# Patient Record
Sex: MALE
[De-identification: names, ages, dates, MRNs, and addresses within clinical notes are randomized per-mention and may not be internally consistent; named-entity substitution may affect disease eponyms.]

---

## 2018-12-14 PROBLEM — Z00.00 ENCOUNTER FOR PREVENTIVE HEALTH EXAMINATION: Status: ACTIVE | Noted: 2018-12-14

## 2019-04-17 ENCOUNTER — RECORD ABSTRACTING (OUTPATIENT)
Age: 29
End: 2019-04-17

## 2019-04-30 ENCOUNTER — APPOINTMENT (OUTPATIENT)
Dept: HEART AND VASCULAR | Facility: CLINIC | Age: 29
End: 2019-04-30
Payer: COMMERCIAL

## 2019-04-30 ENCOUNTER — NON-APPOINTMENT (OUTPATIENT)
Age: 29
End: 2019-04-30

## 2019-04-30 VITALS
BODY MASS INDEX: 22.6 KG/M2 | WEIGHT: 144 LBS | HEART RATE: 77 BPM | SYSTOLIC BLOOD PRESSURE: 110 MMHG | HEIGHT: 67 IN | DIASTOLIC BLOOD PRESSURE: 72 MMHG

## 2019-04-30 DIAGNOSIS — G43.709 CHRONIC MIGRAINE W/OUT AURA, NOT INTRACTABLE, W/OUT STATUS MIGRAINOSUS: ICD-10-CM

## 2019-04-30 DIAGNOSIS — K20.0 EOSINOPHILIC ESOPHAGITIS: ICD-10-CM

## 2019-04-30 DIAGNOSIS — Z82.49 FAMILY HISTORY OF ISCHEMIC HEART DISEASE AND OTHER DISEASES OF THE CIRCULATORY SYSTEM: ICD-10-CM

## 2019-04-30 DIAGNOSIS — Z84.89 FAMILY HISTORY OF OTHER SPECIFIED CONDITIONS: ICD-10-CM

## 2019-04-30 DIAGNOSIS — Z87.19 PERSONAL HISTORY OF OTHER DISEASES OF THE DIGESTIVE SYSTEM: ICD-10-CM

## 2019-04-30 PROCEDURE — 93000 ELECTROCARDIOGRAM COMPLETE: CPT

## 2019-04-30 PROCEDURE — 99215 OFFICE O/P EST HI 40 MIN: CPT

## 2019-04-30 RX ORDER — FLUTICASONE PROPIONATE AND SALMETEROL 500; 50 UG/1; UG/1
POWDER RESPIRATORY (INHALATION)
Refills: 0 | Status: ACTIVE | COMMUNITY

## 2019-04-30 RX ORDER — PROCHLORPERAZINE MALEATE 10 MG/1
10 TABLET, FILM COATED ORAL
Refills: 0 | Status: ACTIVE | COMMUNITY

## 2019-04-30 RX ORDER — FREMANEZUMAB-VFRM 225 MG/1.5ML
225 INJECTION SUBCUTANEOUS
Refills: 0 | Status: ACTIVE | COMMUNITY

## 2019-04-30 NOTE — PHYSICAL EXAM
[General Appearance - Well Developed] : well developed [Normal Appearance] : normal appearance [Well Groomed] : well groomed [General Appearance - Well Nourished] : well nourished [No Deformities] : no deformities [General Appearance - In No Acute Distress] : no acute distress [Normal Conjunctiva] : the conjunctiva exhibited no abnormalities [Eyelids - No Xanthelasma] : the eyelids demonstrated no xanthelasmas [Normal Oral Mucosa] : normal oral mucosa [No Oral Pallor] : no oral pallor [No Oral Cyanosis] : no oral cyanosis [Normal Jugular Venous A Waves Present] : normal jugular venous A waves present [Normal Jugular Venous V Waves Present] : normal jugular venous V waves present [No Jugular Venous Rea A Waves] : no jugular venous rea A waves [Heart Rate And Rhythm] : heart rate and rhythm were normal [Heart Sounds] : normal S1 and S2 [Murmurs] : no murmurs present [Respiration, Rhythm And Depth] : normal respiratory rhythm and effort [Exaggerated Use Of Accessory Muscles For Inspiration] : no accessory muscle use [Auscultation Breath Sounds / Voice Sounds] : lungs were clear to auscultation bilaterally [Abdomen Soft] : soft [Abdomen Tenderness] : non-tender [Abdomen Mass (___ Cm)] : no abdominal mass palpated [Abnormal Walk] : normal gait [Gait - Sufficient For Exercise Testing] : the gait was sufficient for exercise testing [Skin Color & Pigmentation] : normal skin color and pigmentation [] : no rash [No Venous Stasis] : no venous stasis [Skin Lesions] : no skin lesions [No Skin Ulcers] : no skin ulcer [No Xanthoma] : no  xanthoma was observed [Oriented To Time, Place, And Person] : oriented to person, place, and time [Affect] : the affect was normal [Mood] : the mood was normal [No Anxiety] : not feeling anxious

## 2019-07-11 ENCOUNTER — APPOINTMENT (OUTPATIENT)
Dept: HEART AND VASCULAR | Facility: CLINIC | Age: 29
End: 2019-07-11
Payer: COMMERCIAL

## 2019-07-11 PROCEDURE — 93306 TTE W/DOPPLER COMPLETE: CPT

## 2019-07-30 ENCOUNTER — OTHER (OUTPATIENT)
Age: 29
End: 2019-07-30

## 2019-09-12 NOTE — ASSESSMENT
[FreeTextEntry1] : 29 year old male w/ a family history of premature CAD and a PMHx of bicuspid aortic valve, migraines, chronic neck and back pain, who his known to me from [facility] presents to establish care.

## 2019-09-12 NOTE — DISCUSSION/SUMMARY
[FreeTextEntry1] : Will get an echo to assess aortic valve and proximal aortic dimensions. Will get an MRA a year from now and then reassess every other year alternating. \par Explained that ideal LDL would be <100 and he will attempt to improve with diet although he is somewhat limited. \par Advised to continue current healthy lifestyle. \par Return in 6 months for follow-up. \par \par *Addendum- Mr. Coe's echocardiogram visualizes only the very proximal ascending aorta well. The bulk of the ascending aorta could not be visualized based on the fact that only limited imaging could be obtained in the right parasternal views.  Based on this finding, the patient will require further assessment of the entire ascending aorta with imaging study.

## 2019-09-12 NOTE — HISTORY OF PRESENT ILLNESS
[FreeTextEntry1] : 29 year old male w/ a family history of premature CAD and a PMHx of bicuspid aortic valve, migraines, chronic neck and back pain, who his known to me from [facility] presents to establish care. \par \par He has been feeling very well lately and reports that the new preventive medication (Ajovy- anitbody for calcitonin) that he is taking for his migraines has been very effective. He is exercising regularly and at a new job that he enjoys. No symptoms noted on exertion. He is limited by food allergies in terms of diet. He has been exercising regularly without any symptoms.\par \par 5/15/2017 -  TG 62 HDL 61  A1c 5.2%\par \par MRI cervical spine w/o contrast neuro 17 - small central disc protrusion at C4-C5; cervical spine canal and neural foramina are patent throughout \par \par MRA thoracic aorta 17 - no aortic aneurysm or coarctation; aortic valve is bicuspid w/ mild aortic regurgitation and mild-moderate stenosis \par \par echo 18 - no LVH; LV global wall motion is normal; LVEF normal (65%); RV is normal in size and function; aortic root is normal in size; ascending aorta is dilated - diameter is 3.4cm; mild AS; aortic valve is thickened and appears trileaflet w/o any raphae; LVOT is mildly dilated as is the ascending aorta w/ mild AR w/c raises the suspicion of a bicuspid valve, however this is not apparent on this study; mild AS w/ a valve area of 1.71cm2; no pericardial effusion; no prior study available for comparison \par \par Echo  - nl LV size and function; mild AI; mild MR; nl aortic root size\par \par Maternal uncle  at 54 of heart disease, paternal GF-  had a murmur \par \par . \par

## 2019-09-12 NOTE — ASSESSMENT
[FreeTextEntry1] : 29 year old male w/ a family history of premature CAD and a PMHx of bicuspid aortic valve, migraines, chronic neck and back pain, who his known to me from [facility] presents to establish care.

## 2019-09-12 NOTE — HISTORY OF PRESENT ILLNESS
[FreeTextEntry1] : 29 year old male w/ a family history of premature CAD and a PMHx of bicuspid aortic valve, migraines, chronic neck and back pain, who his known to me from [facility] presents to establish care. \par \par He has been feeling very well lately and reports that the new preventive medication (Ajovy- anitbody for calcitonin) that he is taking for his migraines has been very effective. He is exercising regularly and at a new job that he enjoys. No symptoms noted on exertion. He is limited by food allergies in terms of diet. He has been exercising regularly without any symptoms.\par \par 5/15/2017 -  TG 62 HDL 61  A1c 5.2%\par \par MRI cervical spine w/o contrast neuro 17 - small central disc protrusion at C4-C5; cervical spine canal and neural foramina are patent throughout \par \par MRA thoracic aorta 17 - no aortic aneurysm or coarctation; aortic valve is bicuspid w/ mild aortic regurgitation and mild-moderate stenosis \par \par echo 18 - no LVH; LV global wall motion is normal; LVEF normal (65%); RV is normal in size and function; aortic root is normal in size; ascending aorta is dilated - diameter is 3.4cm; mild AS; aortic valve is thickened and appears trileaflet w/o any raphae; LVOT is mildly dilated as is the ascending aorta w/ mild AR w/c raises the suspicion of a bicuspid valve, however this is not apparent on this study; mild AS w/ a valve area of 1.71cm2; no pericardial effusion; no prior study available for comparison \par \par Echo  - nl LV size and function; mild AI; mild MR; nl aortic root size\par \par Maternal uncle  at 54 of heart disease, paternal GF-  had a murmur \par \par . \par

## 2020-04-06 ENCOUNTER — TRANSCRIPTION ENCOUNTER (OUTPATIENT)
Age: 30
End: 2020-04-06

## 2020-06-04 ENCOUNTER — APPOINTMENT (OUTPATIENT)
Dept: HEART AND VASCULAR | Facility: CLINIC | Age: 30
End: 2020-06-04
Payer: COMMERCIAL

## 2020-06-04 PROCEDURE — 99214 OFFICE O/P EST MOD 30 MIN: CPT | Mod: 95

## 2020-06-04 NOTE — HISTORY OF PRESENT ILLNESS
[Home] : at home, [unfilled] , at the time of the visit. [Medical Office: ([facility])___] : at the medical office located in  [Verbal consent obtained from patient] : the patient, [unfilled] [FreeTextEntry1] : This visit was conducted using a telehealth platform in the setting of COVID-19 Pandemic.\par Patient initiated current encounter. Patient has provided verbal authorization to participate in this visit. \par Reason for telehealth visit: follow-up of CVD\par I have spent 25 minutes with the patient discussing.\par Documentation- 5 minutes\par Physical exam was not done, however patient provided weight.\par \par \par Patient is a 31 yo M w/ a PMHx of bicuspid aortic valve, migraines, chronic neck and back pain, who his known from [facility] who calls for follow up.\par \par He has been working from home and has been healthy. Has been exercising 1-2miles almost every day, more active on weekends. Occasionally mentions some shortness of breath but only when going uphill and mentions this being more so lately that he has not been as active as before. Denies any other symptoms. Diet wise has been unchanged and he currently weights 140lbs. Has not been checking his BP.\par  \par Mentions that his mom had procedure to remove an abnormality in her aorta last year and was told the results were normal.

## 2020-06-04 NOTE — REASON FOR VISIT
[FreeTextEntry1] : Patient is a 31 yo M w/ a PMHx of bicuspid aortic valve, migraines, chronic neck and back pain, who his known from [facility] who calls for follow up.

## 2020-06-04 NOTE — DISCUSSION/SUMMARY
[___ Month(s)] : [unfilled] month(s) [With ___] : with [unfilled] [FreeTextEntry1] : Patient is a 31 yo M w/ a PMHx of bicuspid aortic valve, migraines, chronic neck and back pain, who his known from [facility] who calls for follow up.\par \par # Bicuspid aortic valve.\par - Will order echocardiogram\par - Given bicuspid aortic valve, he's at higher risk for dilatation of aorta and the ascending aorta has not been well seen on prior echoes. Will assess aorta on echo, will order for MRI to better assess based o those results.   \par \par # ASCVD risk reduction\par - Will order labs to assess lipids and A1C\par - Encouraged patient to continue healthy exercise and eating habits, focusing on a Mediterranean style of eating and aiming for the recommended 150 minutes per week of moderate physical activity.\par \par RTC in 1 month for echo and labs

## 2020-06-08 ENCOUNTER — TRANSCRIPTION ENCOUNTER (OUTPATIENT)
Age: 30
End: 2020-06-08

## 2020-06-11 ENCOUNTER — APPOINTMENT (OUTPATIENT)
Dept: HEART AND VASCULAR | Facility: CLINIC | Age: 30
End: 2020-06-11

## 2020-07-02 ENCOUNTER — TRANSCRIPTION ENCOUNTER (OUTPATIENT)
Age: 30
End: 2020-07-02

## 2020-12-28 ENCOUNTER — TRANSCRIPTION ENCOUNTER (OUTPATIENT)
Age: 30
End: 2020-12-28

## 2020-12-29 ENCOUNTER — TRANSCRIPTION ENCOUNTER (OUTPATIENT)
Age: 30
End: 2020-12-29

## 2021-02-05 ENCOUNTER — APPOINTMENT (OUTPATIENT)
Dept: HEART AND VASCULAR | Facility: CLINIC | Age: 31
End: 2021-02-05
Payer: COMMERCIAL

## 2021-02-05 ENCOUNTER — NON-APPOINTMENT (OUTPATIENT)
Age: 31
End: 2021-02-05

## 2021-02-05 VITALS
TEMPERATURE: 98.3 F | SYSTOLIC BLOOD PRESSURE: 118 MMHG | HEIGHT: 67 IN | HEART RATE: 75 BPM | DIASTOLIC BLOOD PRESSURE: 78 MMHG | WEIGHT: 157 LBS | BODY MASS INDEX: 24.64 KG/M2

## 2021-02-05 PROCEDURE — 93306 TTE W/DOPPLER COMPLETE: CPT

## 2021-02-05 PROCEDURE — 93000 ELECTROCARDIOGRAM COMPLETE: CPT

## 2021-02-05 PROCEDURE — 99072 ADDL SUPL MATRL&STAF TM PHE: CPT

## 2021-02-05 PROCEDURE — 99214 OFFICE O/P EST MOD 30 MIN: CPT

## 2021-02-05 NOTE — REASON FOR VISIT
[Follow-Up - Clinic] : a clinic follow-up of [FreeTextEntry1] : 30 year old M (known from [facility]) with a PMHx of bicuspid aortic valve, migraines who presents for follow up. \par \par Gained 13 lbs since April 2019. Concerned about cholesterol level ( mg/dL). Keeps active with tennis and biking. Says he has been more SOB after his tennis sessions. Reports numbness and tingling of both arms, and tingling in the back of his neck. Diet is limited, no dairy, egg, fish, nut, citrus fruits 2/2 food allergy. \par \par Last echocardiogram was July 11th, 2019 which showed LVEF 60%, G1DD, bicuspid aortic valve with fusion of the left and noncoronary cusps, mild to moderate AR, minimal TR. \par \par Had MRA thoracic aorta 8/2017: bicuspid valve is bicuspid with mild aortic regurgitation and mild to moderate stenosis. \par \par 1/2021 labs: a1c 4.8%,  mg/dL, HDL 47, TG 92.\par \par Mother's brother - SCD\par Paternal GF - 4V CABG

## 2021-02-05 NOTE — DISCUSSION/SUMMARY
[FreeTextEntry1] : 30 year old M (known from [facility]) with of hyperlipidemia, bicuspid aortic valve, migraines who presents for follow up.\par \par Bicuspid aortic valve/mild-moderate AR\par - 2D echocardiogram today showed mild aortic root dilation, mild-moderate AR \par - Evaluate aortic root with MRI next year (2022)\par \par Hyperlipidemia\par -  mg/dL (1/2021 labs), patient has gained 13 lbs since last in office visit\par - Recheck lipid profile again in three months in addition to lipoprotein (a) \par - Dietary choices are limited given food allergies (to nuts, eggs, citrus, dairy) but was counseled on higher fiber intake, whole grains, daily fruits and vegetables \par

## 2021-02-05 NOTE — PHYSICAL EXAM
[General Appearance - Well Developed] : well developed [Normal Appearance] : normal appearance [Well Groomed] : well groomed [General Appearance - Well Nourished] : well nourished [No Deformities] : no deformities [General Appearance - In No Acute Distress] : no acute distress [Respiration, Rhythm And Depth] : normal respiratory rhythm and effort [Exaggerated Use Of Accessory Muscles For Inspiration] : no accessory muscle use [Auscultation Breath Sounds / Voice Sounds] : lungs were clear to auscultation bilaterally [Heart Rate And Rhythm] : heart rate and rhythm were normal [Heart Sounds] : normal S1 and S2 [Murmurs] : no murmurs present [Abdomen Soft] : soft [Abdomen Tenderness] : non-tender [Abdomen Mass (___ Cm)] : no abdominal mass palpated [Skin Color & Pigmentation] : normal skin color and pigmentation [] : no rash [No Venous Stasis] : no venous stasis [Skin Lesions] : no skin lesions [No Skin Ulcers] : no skin ulcer [No Xanthoma] : no  xanthoma was observed [Oriented To Time, Place, And Person] : oriented to person, place, and time [Affect] : the affect was normal [Mood] : the mood was normal [No Anxiety] : not feeling anxious

## 2021-08-06 ENCOUNTER — TRANSCRIPTION ENCOUNTER (OUTPATIENT)
Age: 31
End: 2021-08-06

## 2021-09-27 ENCOUNTER — TRANSCRIPTION ENCOUNTER (OUTPATIENT)
Age: 31
End: 2021-09-27

## 2021-12-15 ENCOUNTER — TRANSCRIPTION ENCOUNTER (OUTPATIENT)
Age: 31
End: 2021-12-15

## 2022-01-07 DIAGNOSIS — I77.810 THORACIC AORTIC ECTASIA: ICD-10-CM

## 2022-01-10 ENCOUNTER — TRANSCRIPTION ENCOUNTER (OUTPATIENT)
Age: 32
End: 2022-01-10

## 2022-01-11 ENCOUNTER — NON-APPOINTMENT (OUTPATIENT)
Age: 32
End: 2022-01-11

## 2022-01-20 ENCOUNTER — TRANSCRIPTION ENCOUNTER (OUTPATIENT)
Age: 32
End: 2022-01-20

## 2022-02-01 ENCOUNTER — NON-APPOINTMENT (OUTPATIENT)
Age: 32
End: 2022-02-01

## 2022-02-03 ENCOUNTER — TRANSCRIPTION ENCOUNTER (OUTPATIENT)
Age: 32
End: 2022-02-03

## 2022-02-04 ENCOUNTER — TRANSCRIPTION ENCOUNTER (OUTPATIENT)
Age: 32
End: 2022-02-04

## 2022-03-18 ENCOUNTER — NON-APPOINTMENT (OUTPATIENT)
Age: 32
End: 2022-03-18

## 2022-03-18 ENCOUNTER — APPOINTMENT (OUTPATIENT)
Dept: HEART AND VASCULAR | Facility: CLINIC | Age: 32
End: 2022-03-18
Payer: COMMERCIAL

## 2022-03-18 VITALS
SYSTOLIC BLOOD PRESSURE: 113 MMHG | OXYGEN SATURATION: 99 % | HEART RATE: 113 BPM | WEIGHT: 150 LBS | DIASTOLIC BLOOD PRESSURE: 76 MMHG | BODY MASS INDEX: 23.54 KG/M2 | TEMPERATURE: 97.9 F | HEIGHT: 67 IN

## 2022-03-18 PROCEDURE — 93000 ELECTROCARDIOGRAM COMPLETE: CPT

## 2022-03-18 PROCEDURE — 99215 OFFICE O/P EST HI 40 MIN: CPT

## 2022-03-18 RX ORDER — PANTOPRAZOLE SODIUM 40 MG/1
40 GRANULE, DELAYED RELEASE ORAL
Refills: 0 | Status: ACTIVE | COMMUNITY

## 2022-03-18 RX ORDER — UBROGEPANT 100 MG/1
100 TABLET ORAL
Refills: 0 | Status: ACTIVE | COMMUNITY

## 2022-03-18 RX ORDER — SUMATRIPTAN SUCCINATE 6 MG/.5ML
6 INJECTION SUBCUTANEOUS
Refills: 0 | Status: DISCONTINUED | COMMUNITY
End: 2022-03-18

## 2022-03-18 NOTE — PHYSICAL EXAM
[General Appearance - Well Developed] : well developed [Normal Appearance] : normal appearance [Well Groomed] : well groomed [General Appearance - Well Nourished] : well nourished [No Deformities] : no deformities [General Appearance - In No Acute Distress] : no acute distress [Respiration, Rhythm And Depth] : normal respiratory rhythm and effort [Exaggerated Use Of Accessory Muscles For Inspiration] : no accessory muscle use [Auscultation Breath Sounds / Voice Sounds] : lungs were clear to auscultation bilaterally [Heart Rate And Rhythm] : heart rate and rhythm were normal [Heart Sounds] : normal S1 and S2 [Abdomen Tenderness] : non-tender [Abdomen Soft] : soft [Abdomen Mass (___ Cm)] : no abdominal mass palpated [Skin Color & Pigmentation] : normal skin color and pigmentation [] : no rash [No Venous Stasis] : no venous stasis [Skin Lesions] : no skin lesions [No Skin Ulcers] : no skin ulcer [No Xanthoma] : no  xanthoma was observed [Oriented To Time, Place, And Person] : oriented to person, place, and time [Affect] : the affect was normal [Mood] : the mood was normal [No Anxiety] : not feeling anxious [Systolic grade ___/6] : A grade [unfilled]/6 systolic murmur was heard.

## 2022-03-18 NOTE — DISCUSSION/SUMMARY
[FreeTextEntry1] : 31 year old M (known from [facility]) with of hyperlipidemia, bicuspid aortic valve, migraines who presents for follow up.\par \par Bicuspid aortic valve/mild-moderate AR\par - Ascending aorta (3.9cm) is slightly larger than root (3.3). \par Needs ECHO Feb 2023.\par \par Hyperlipidemia\par - Recheck lipid profile \par - Dietary choices are limited given food allergies (to nuts, eggs, citrus, dairy) but was counseled on higher fiber intake, whole grains, daily fruits and vegetables \par \par Follow up 6 months

## 2022-03-18 NOTE — REASON FOR VISIT
[Follow-Up - Clinic] : a clinic follow-up of [CV Risk Factors and Non-Cardiac Disease] : CV risk factors and non-cardiac disease [Structural Heart and Valve Disease] : structural heart and valve disease [FreeTextEntry1] : bicuspid aortic valve

## 2022-03-21 ENCOUNTER — TRANSCRIPTION ENCOUNTER (OUTPATIENT)
Age: 32
End: 2022-03-21

## 2022-03-23 ENCOUNTER — TRANSCRIPTION ENCOUNTER (OUTPATIENT)
Age: 32
End: 2022-03-23

## 2022-04-05 ENCOUNTER — NON-APPOINTMENT (OUTPATIENT)
Age: 32
End: 2022-04-05

## 2022-06-15 ENCOUNTER — APPOINTMENT (OUTPATIENT)
Dept: CARDIOLOGY | Facility: CLINIC | Age: 32
End: 2022-06-15
Payer: COMMERCIAL

## 2022-06-15 DIAGNOSIS — M35.7 HYPERMOBILITY SYNDROME: ICD-10-CM

## 2022-06-15 PROCEDURE — 99204 OFFICE O/P NEW MOD 45 MIN: CPT | Mod: 95

## 2022-06-15 NOTE — HISTORY OF PRESENT ILLNESS
[FreeTextEntry1] : VERN HENSON is a 31 yo M PMH bicuspid AV, enlarged aortic root, and HLD. 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

## 2022-06-15 NOTE — REVIEW OF SYSTEMS
[Negative] : Cardiovascular/PVD [Subluxation] : no subluxation [Joint Dislocation] : no joint dislocation [Joint Pain] : no joint pain

## 2022-06-15 NOTE — REASON FOR VISIT
[FreeTextEntry3] : Dear Dr. John         . I saw your patient VERN HENSON on 06/15/2022 . Please see the note below for the assessment and plan. \par \par VERN HENSON  was seen  for an initial consultation at the Cardiogenomics Program at [facility] on 06/15/2022.   Mr. HENSON was referred by Dr John for hereditary cardiac predisposition risk assessment and counseling, due to enlarged aorta FH SCD\par \par

## 2022-06-15 NOTE — FAMILY HISTORY
[FreeTextEntry1] : FamilyHistory_20_twCiteListControlStart FamilyHistory_20_twCiteListControlEnd [id number] \par A four-generation family history was constructed and scanned into [employer]. \par Family history is significant for: \par siblings\par 25 yo sister , healthy: hypermobile, dancer\par mother had miscarriage unk details \par \par Mother 64 yo  hx arrhythmia s/p ablation, br CA in-situ in remission\par Maternal aunts none\par Maternal uncles x2\par uncle dec 55 yo cardiac arrest sudden: 3 daughters 38 yo, 36 yo, 33 yo all healthy \par uncle 59 yo healthy : 3 daughters : 30 yo twins identical,  25 yo daughter all healthy\par Maternal Grandmother dec mid 80s AD\par Maternal Grandfather dec 93 yo hx colon CA (2000), CAD s.p CABG x4, PCI, CHF , kidney failure at end of his life\par \par Father [address]yo st 1 kidney disease, preDM , anxiety\par Paternal aunts none\par Paternal uncles x1 60s med hx unk , out of care, no children\par Paternal Grandfather dec 89 yo natural causes, heart condition details unk\par Paternal Grandmother dec late 80s DM, severe dementia\par \par children: none\par \par his maternal families originate from [place] and paternal families originate from [place] \par No Ashkenazi Mormon ancestry. \par Family history was negative for consanguinity  \par No family history of SIDS\par  \par \par  [FreeTextEntry2] : [facility] [FreeTextEntry3] : [place]

## 2022-07-11 ENCOUNTER — TRANSCRIPTION ENCOUNTER (OUTPATIENT)
Age: 32
End: 2022-07-11

## 2022-07-29 ENCOUNTER — TRANSCRIPTION ENCOUNTER (OUTPATIENT)
Age: 32
End: 2022-07-29

## 2022-08-24 ENCOUNTER — TRANSCRIPTION ENCOUNTER (OUTPATIENT)
Age: 32
End: 2022-08-24

## 2022-08-26 ENCOUNTER — TRANSCRIPTION ENCOUNTER (OUTPATIENT)
Age: 32
End: 2022-08-26

## 2022-09-20 ENCOUNTER — TRANSCRIPTION ENCOUNTER (OUTPATIENT)
Age: 32
End: 2022-09-20

## 2022-10-10 ENCOUNTER — TRANSCRIPTION ENCOUNTER (OUTPATIENT)
Age: 32
End: 2022-10-10

## 2022-10-13 ENCOUNTER — NON-APPOINTMENT (OUTPATIENT)
Age: 32
End: 2022-10-13

## 2022-10-17 ENCOUNTER — NON-APPOINTMENT (OUTPATIENT)
Age: 32
End: 2022-10-17

## 2022-11-14 ENCOUNTER — APPOINTMENT (OUTPATIENT)
Dept: HEART AND VASCULAR | Facility: CLINIC | Age: 32
End: 2022-11-14

## 2022-11-14 PROCEDURE — 99215 OFFICE O/P EST HI 40 MIN: CPT | Mod: 95

## 2022-11-14 NOTE — DISCUSSION/SUMMARY
[FreeTextEntry1] : 31 yo M w/ a PMHx of bicuspid aortic valve, migraines, chronic neck and back pain, who his known from [facility] now getting follow-up. \par \par Although he is interested in learning about whether he has an additional aortopathy other than just related to bicuspid valve, he is currently working on insurance issues. We discussed benefits and downsides to genetic testing. \par \par Aortopathy--He will get MRA done in January 2023. We will have a telehealth after that appointment. \par \par Bicuspid Aortic Valve-  Echo in Summer with follow-up in person- for echo and visit. Currently mild AI. \par \par Lipids- will have him work on lifestyle at this time to assure that he is getting his LDL to goal and improving his overall health. He is somewhat limited due to multiple food allergies. He will work to increase activity. \par \par \par \par

## 2022-11-14 NOTE — HISTORY OF PRESENT ILLNESS
[FreeTextEntry1] : 31 yo M w/ a PMHx of bicuspid aortic valve, migraines, chronic neck and back pain, who his known from [facility] now getting follow-up. \par \par He has been trying to decide on a gym and has been trying to do pickleball. He felt that [employer] was a bit too intense. \par \par He has been doing research on his own condition and what it means. He was trying to figure out next steps. \par \par He has been trying to get the insurance coverage for additional testing. \par \par 2/2021-bicuspid AV, mild-mod AI, nl LV & RV size and function, no sign valvular disease, asc aorta- 3.5 cm\par \par July 11th, 2019- LVEF 60%, G1DD, bicuspid aortic valve with fusion of the left and noncoronary cusps, mild to moderate AR, minimal TR. \par \par MRA of aorta 2/2022: Dilated aorta (sinus of Valsalva is 3.3cm, ascending aorta 3.9cm @ pulmonary artery, sinotubular junc 2.7cm). \par Had MRA thoracic aorta 8/2017: bicuspid valve is bicuspid with mild aortic regurgitation and mild to moderate stenosis. sinus of valsalva 2.8cm, sinotubular 2.3cm, ascending aorta 3.2cm, \par \par Mother's brother - SCD\par Paternal GF - 4V CABG \par \par 10/22- chol 187, HDL 37, , trig 111

## 2023-02-06 ENCOUNTER — TRANSCRIPTION ENCOUNTER (OUTPATIENT)
Age: 33
End: 2023-02-06

## 2023-02-07 ENCOUNTER — TRANSCRIPTION ENCOUNTER (OUTPATIENT)
Age: 33
End: 2023-02-07

## 2023-02-14 ENCOUNTER — TRANSCRIPTION ENCOUNTER (OUTPATIENT)
Age: 33
End: 2023-02-14

## 2023-02-17 ENCOUNTER — TRANSCRIPTION ENCOUNTER (OUTPATIENT)
Age: 33
End: 2023-02-17

## 2023-02-21 ENCOUNTER — TRANSCRIPTION ENCOUNTER (OUTPATIENT)
Age: 33
End: 2023-02-21

## 2023-02-24 ENCOUNTER — TRANSCRIPTION ENCOUNTER (OUTPATIENT)
Age: 33
End: 2023-02-24

## 2023-03-15 ENCOUNTER — APPOINTMENT (OUTPATIENT)
Dept: CARDIOLOGY | Facility: CLINIC | Age: 33
End: 2023-03-15

## 2023-03-16 ENCOUNTER — TRANSCRIPTION ENCOUNTER (OUTPATIENT)
Age: 33
End: 2023-03-16

## 2023-03-21 ENCOUNTER — TRANSCRIPTION ENCOUNTER (OUTPATIENT)
Age: 33
End: 2023-03-21

## 2023-04-06 ENCOUNTER — APPOINTMENT (OUTPATIENT)
Dept: HEART AND VASCULAR | Facility: CLINIC | Age: 33
End: 2023-04-06

## 2023-07-17 ENCOUNTER — APPOINTMENT (OUTPATIENT)
Dept: HEART AND VASCULAR | Facility: CLINIC | Age: 33
End: 2023-07-17
Payer: COMMERCIAL

## 2023-07-17 VITALS — SYSTOLIC BLOOD PRESSURE: 118 MMHG | DIASTOLIC BLOOD PRESSURE: 78 MMHG

## 2023-07-17 VITALS
SYSTOLIC BLOOD PRESSURE: 130 MMHG | WEIGHT: 167 LBS | HEART RATE: 100 BPM | BODY MASS INDEX: 26.21 KG/M2 | HEIGHT: 67 IN | DIASTOLIC BLOOD PRESSURE: 89 MMHG

## 2023-07-17 DIAGNOSIS — Q23.1 CONGENITAL INSUFFICIENCY OF AORTIC VALVE: ICD-10-CM

## 2023-07-17 PROCEDURE — 93000 ELECTROCARDIOGRAM COMPLETE: CPT

## 2023-07-17 PROCEDURE — 36415 COLL VENOUS BLD VENIPUNCTURE: CPT

## 2023-07-17 PROCEDURE — 99215 OFFICE O/P EST HI 40 MIN: CPT | Mod: 25

## 2023-07-17 RX ORDER — VENLAFAXINE 75 MG/1
75 TABLET ORAL
Refills: 0 | Status: ACTIVE | COMMUNITY

## 2023-07-17 NOTE — DISCUSSION/SUMMARY
[FreeTextEntry1] : \par \par \par \par  [EKG obtained to assist in diagnosis and management of assessed problem(s)] : EKG obtained to assist in diagnosis and management of assessed problem(s)

## 2023-07-17 NOTE — REVIEW OF SYSTEMS
[Fever] : no fever [Chills] : no chills [SOB] : no shortness of breath [Dyspnea on exertion] : not dyspnea during exertion [Chest Discomfort] : no chest discomfort [Lower Ext Edema] : no extremity edema [Leg Claudication] : no intermittent leg claudication [Palpitations] : no palpitations [Orthopnea] : no orthopnea [PND] : no PND [Syncope] : no syncope [Cough] : no cough

## 2023-07-17 NOTE — HISTORY OF PRESENT ILLNESS
[FreeTextEntry1] : 32 yo M w/ a PMHx of bicuspid aortic valve, migraines, chronic neck and back pain, who his known from [facility] now getting follow-up. \par \par Since his last visit, pt. is feeling well overall. \par \par He is concerned over his elevated BP reading on arrival today.\par \par His last LDL (10/2022) was 128. He is working on lifestyle by limiting fried foods in his diet. \par \par He remains active playing pickleball and walking with incline. \par \par Mother's brother - SCD\par Paternal GF - 4V CABG \par ====================Imaging and labs data=============================\par - echo 2/2021-bicuspid AV, mild-mod AI, nl LV & RV size and function, no sign valvular disease, asc aorta- 3.5 cm\par - echo July 11th, 2019- LVEF 60%, G1DD, bicuspid aortic valve with fusion of the left and noncoronary cusps, mild to moderate AR, minimal TR. \par - MRA of aorta 02/2023: mild dilatation of ascending thoracic aorta measuring up to 3.5 cm\par - MRA of aorta 2/2022: Dilated aorta (sinus of Valsalva is 3.3 cm, ascending aorta 3.9 cm @ pulmonary artery, sinotubular junc 2.7cm). \par - Had MRA thoracic aorta 8/2017: bicuspid valve is bicuspid with mild aortic regurgitation and mild to moderate stenosis. sinus of valsalva 2.8cm, sinotubular 2.3cm, ascending aorta 3.2cm, \par \par - lipid panel 10/22- chol 187, HDL 37, , trig 111

## 2023-07-17 NOTE — ASSESSMENT
[FreeTextEntry1] : 34 yo M w/ a PMHx of bicuspid aortic valve, migraines, chronic neck and back pain, who his known from [facility] now getting follow-up. \par \par Bicuspid aortic valve/aortic root dilatation:\par - Stable \par - MRA (02/2023) measured the ascending aorta at 3.5 cm \par - Repeat echo today measured an ascending aorta of 3.5 cm with normal LVSF and mild AR - unchanged from prior echo (02/2021)\par - Pt. will repeat MRA in 02/2024 for surveillance \par - He underwent genetic testing with Dr. Sanchez \par \par HLD:\par - Last LDL (10/2022) 128 above goal LDL < 100 - pt. requesting repeat lipids today as he has been working to lower his LDL through diet \par - Encouraged patient to continue healthy exercise and eating habits, focusing on a Mediterranean style of eating and aiming for the recommended 150 minutes per week of moderate physical activity \par \par Pt. to RTC in one year for follow-up

## 2023-07-21 ENCOUNTER — APPOINTMENT (OUTPATIENT)
Dept: HEART AND VASCULAR | Facility: CLINIC | Age: 33
End: 2023-07-21

## 2023-07-24 ENCOUNTER — TRANSCRIPTION ENCOUNTER (OUTPATIENT)
Age: 33
End: 2023-07-24

## 2023-07-25 ENCOUNTER — TRANSCRIPTION ENCOUNTER (OUTPATIENT)
Age: 33
End: 2023-07-25

## 2023-07-26 ENCOUNTER — TRANSCRIPTION ENCOUNTER (OUTPATIENT)
Age: 33
End: 2023-07-26

## 2023-08-09 ENCOUNTER — NON-APPOINTMENT (OUTPATIENT)
Age: 33
End: 2023-08-09

## 2023-08-24 ENCOUNTER — TRANSCRIPTION ENCOUNTER (OUTPATIENT)
Age: 33
End: 2023-08-24

## 2023-08-25 ENCOUNTER — APPOINTMENT (OUTPATIENT)
Dept: HEART AND VASCULAR | Facility: CLINIC | Age: 33
End: 2023-08-25

## 2023-09-13 ENCOUNTER — APPOINTMENT (OUTPATIENT)
Dept: CARDIOLOGY | Facility: CLINIC | Age: 33
End: 2023-09-13

## 2023-09-13 ENCOUNTER — APPOINTMENT (OUTPATIENT)
Age: 33
End: 2023-09-13

## 2023-10-06 ENCOUNTER — TRANSCRIPTION ENCOUNTER (OUTPATIENT)
Age: 33
End: 2023-10-06

## 2023-11-07 ENCOUNTER — TRANSCRIPTION ENCOUNTER (OUTPATIENT)
Age: 33
End: 2023-11-07

## 2023-11-09 ENCOUNTER — TRANSCRIPTION ENCOUNTER (OUTPATIENT)
Age: 33
End: 2023-11-09

## 2023-11-15 ENCOUNTER — APPOINTMENT (OUTPATIENT)
Dept: CARDIOLOGY | Facility: CLINIC | Age: 33
End: 2023-11-15
Payer: COMMERCIAL

## 2023-11-15 PROCEDURE — 99215 OFFICE O/P EST HI 40 MIN: CPT | Mod: 95

## 2024-03-01 ENCOUNTER — OUTPATIENT (OUTPATIENT)
Dept: OUTPATIENT SERVICES | Facility: HOSPITAL | Age: 34
LOS: 1 days | End: 2024-03-01

## 2024-03-01 ENCOUNTER — APPOINTMENT (OUTPATIENT)
Dept: MRI IMAGING | Facility: CLINIC | Age: 34
End: 2024-03-01
Payer: COMMERCIAL

## 2024-03-01 PROCEDURE — 71555 MRI ANGIO CHEST W OR W/O DYE: CPT | Mod: 26

## 2024-03-05 ENCOUNTER — TRANSCRIPTION ENCOUNTER (OUTPATIENT)
Age: 34
End: 2024-03-05

## 2024-03-06 ENCOUNTER — APPOINTMENT (OUTPATIENT)
Dept: HEART AND VASCULAR | Facility: CLINIC | Age: 34
End: 2024-03-06

## 2024-03-08 ENCOUNTER — NON-APPOINTMENT (OUTPATIENT)
Age: 34
End: 2024-03-08

## 2024-03-14 ENCOUNTER — APPOINTMENT (OUTPATIENT)
Dept: HEART AND VASCULAR | Facility: CLINIC | Age: 34
End: 2024-03-14
Payer: COMMERCIAL

## 2024-03-14 DIAGNOSIS — Q23.1 CONGENITAL INSUFFICIENCY OF AORTIC VALVE: ICD-10-CM

## 2024-03-14 DIAGNOSIS — I77.810 THORACIC AORTIC ECTASIA: ICD-10-CM

## 2024-03-14 DIAGNOSIS — E78.5 HYPERLIPIDEMIA, UNSPECIFIED: ICD-10-CM

## 2024-03-14 PROCEDURE — 99214 OFFICE O/P EST MOD 30 MIN: CPT

## 2024-03-14 NOTE — REASON FOR VISIT
[Home] : at home, [unfilled] , at the time of the visit. [Medical Office: ([facility])___] : at the medical office located in  [Patient] : the patient [Self] : self

## 2024-03-14 NOTE — HISTORY OF PRESENT ILLNESS
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 now getting follow-up.    His weight is at 160 lbs Since his last visit, pt. is feeling well overall.   He is concerned over his elevated BP reading on arrival today.  His last LDL (10/2022) was 128. He is working on lifestyle by limiting fried foods in his diet.   He remains active playing pickleball and walking with incline.   Mother's brother - SCD Paternal GF - 4V CABG  ====================Imaging and labs data============================= - MRA- aortic root, 3.5 cm, 3.8 cm, arch 2.3 cm - echo 2/2021-bicuspid AV, mild-mod AI, nl LV & RV size and function, no sign valvular disease, asc aorta- 3.5 cm - 7/17/23- Echo- nl LV & RV size & fx, no significant valvular disease, no pericardial effusion  - echo July 11th, 2019- LVEF 60%, G1DD, bicuspid aortic valve with fusion of the left and noncoronary cusps, mild to moderate AR, minimal TR.  - MRA of aorta 02/2023: mild dilatation of ascending thoracic aorta measuring up to 3.5 cm - MRA of aorta 2/2022: Dilated aorta (sinus of Valsalva is 3.3 cm, ascending aorta 3.9 cm @ pulmonary artery, sinotubular junc 2.7cm).  - Had MRA thoracic aorta 8/2017: bicuspid valve is bicuspid with mild aortic regurgitation and mild to moderate stenosis. sinus of valsalva 2.8cm, sinotubular 2.3cm, ascending aorta 3.2cm,   8/11/23- chol 215, , trig 115, HDL 40 - lipid panel 10/22- chol 187, HDL 37, , trig 111

## 2024-03-14 NOTE — REVIEW OF SYSTEMS
[Fever] : no fever [Chills] : no chills [SOB] : no shortness of breath [Dyspnea on exertion] : not dyspnea during exertion [Chest Discomfort] : no chest discomfort [Lower Ext Edema] : no extremity edema [Leg Claudication] : no intermittent leg claudication [Palpitations] : no palpitations [Orthopnea] : no orthopnea [PND] : no PND [Syncope] : no syncope [Cough] : no cough [Negative] : Heme/Lymph

## 2024-03-14 NOTE — DISCUSSION/SUMMARY
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    Bicuspid aortic valve/aortic root dilatation: - Stable  - MRA (02/2023) measured the ascending aorta at 3.5 cm  -Echo 7/2025.  - Pt. will repeat MRA in 02/2024 for surveillance  - He underwent genetic testing with Dr. Sanchez   HLD: - Last LDL (10/2022) 150's above goal LDL < 100 -lipids to be repeated in a month from his internist - Encouraged patient to continue healthy exercise and eating habits, focusing on a Mediterranean style of eating and aiming for the recommended 150 minutes per week of moderate physical activity and maintain his weight loss.   Pt. to [facility] in July for both his visit and echo. He will send his labs when his internist assesses.

## 2024-03-14 NOTE — PHYSICAL EXAM
[Well Developed] : well developed [Well Nourished] : well nourished [No Acute Distress] : no acute distress [Normal Conjunctiva] : normal conjunctiva [Normal Venous Pressure] : normal venous pressure [No Carotid Bruit] : no carotid bruit [Normal S1, S2] : normal S1, S2 [No Murmur] : no murmur [No Rub] : no rub [No Gallop] : no gallop [Clear Lung Fields] : clear lung fields [Good Air Entry] : good air entry [No Respiratory Distress] : no respiratory distress  [Soft] : abdomen soft [Non Tender] : non-tender [No Masses/organomegaly] : no masses/organomegaly [Normal Bowel Sounds] : normal bowel sounds [Normal Gait] : normal gait [No Edema] : no edema [No Cyanosis] : no cyanosis [No Clubbing] : no clubbing [No Varicosities] : no varicosities [Alert and Oriented] : alert and oriented [Normal memory] : normal memory

## 2024-04-01 ENCOUNTER — APPOINTMENT (RX ONLY)
Dept: URBAN - METROPOLITAN AREA CLINIC 167 | Facility: CLINIC | Age: 34
Setting detail: DERMATOLOGY
End: 2024-04-01

## 2024-04-01 DIAGNOSIS — L91.8 OTHER HYPERTROPHIC DISORDERS OF THE SKIN: ICD-10-CM

## 2024-04-01 DIAGNOSIS — D22 MELANOCYTIC NEVI: ICD-10-CM

## 2024-04-01 DIAGNOSIS — L70.0 ACNE VULGARIS: ICD-10-CM

## 2024-04-01 PROBLEM — D22.5 MELANOCYTIC NEVI OF TRUNK: Status: ACTIVE | Noted: 2024-04-01

## 2024-04-01 PROCEDURE — ? TREATMENT REGIMEN

## 2024-04-01 PROCEDURE — 99203 OFFICE O/P NEW LOW 30 MIN: CPT

## 2024-04-01 PROCEDURE — ? COUNSELING

## 2024-04-01 ASSESSMENT — LOCATION DETAILED DESCRIPTION DERM
LOCATION DETAILED: LEFT INFERIOR MEDIAL MIDBACK
LOCATION DETAILED: LEFT AXILLARY VAULT
LOCATION DETAILED: RIGHT MEDIAL BUTTOCK
LOCATION DETAILED: SUPERIOR THORACIC SPINE

## 2024-04-01 ASSESSMENT — LOCATION SIMPLE DESCRIPTION DERM
LOCATION SIMPLE: LEFT LOWER BACK
LOCATION SIMPLE: RIGHT BUTTOCK
LOCATION SIMPLE: LEFT AXILLARY VAULT
LOCATION SIMPLE: UPPER BACK

## 2024-04-01 ASSESSMENT — LOCATION ZONE DERM
LOCATION ZONE: AXILLAE
LOCATION ZONE: TRUNK

## 2024-04-01 NOTE — PROCEDURE: TREATMENT REGIMEN
Detail Level: Zone
Otc Regimen: Benzoyl Peroxide 10%
Samples Given: CeraVe Foaming Facial Cleanser\\nCeraVe BPO 10%

## 2024-04-30 ENCOUNTER — TRANSCRIPTION ENCOUNTER (OUTPATIENT)
Age: 34
End: 2024-04-30

## 2024-05-28 ENCOUNTER — TRANSCRIPTION ENCOUNTER (OUTPATIENT)
Age: 34
End: 2024-05-28

## 2024-07-08 ENCOUNTER — APPOINTMENT (OUTPATIENT)
Dept: HEART AND VASCULAR | Facility: CLINIC | Age: 34
End: 2024-07-08

## 2024-10-04 ENCOUNTER — APPOINTMENT (RX ONLY)
Dept: URBAN - METROPOLITAN AREA CLINIC 167 | Facility: CLINIC | Age: 34
Setting detail: DERMATOLOGY
End: 2024-10-04

## 2024-10-04 DIAGNOSIS — L70.0 ACNE VULGARIS: ICD-10-CM

## 2024-10-04 DIAGNOSIS — L81.4 OTHER MELANIN HYPERPIGMENTATION: ICD-10-CM

## 2024-10-04 DIAGNOSIS — Z41.9 ENCOUNTER FOR PROCEDURE FOR PURPOSES OTHER THAN REMEDYING HEALTH STATE, UNSPECIFIED: ICD-10-CM

## 2024-10-04 PROCEDURE — ? PRESCRIPTION

## 2024-10-04 PROCEDURE — ? TREATMENT REGIMEN

## 2024-10-04 PROCEDURE — 99214 OFFICE O/P EST MOD 30 MIN: CPT

## 2024-10-04 PROCEDURE — ? ADDITIONAL NOTES

## 2024-10-04 PROCEDURE — ? COUNSELING

## 2024-10-04 ASSESSMENT — SEVERITY ASSESSMENT OVERALL AMONG ALL PATIENTS
IN YOUR EXPERIENCE, AMONG ALL PATIENTS YOU HAVE SEEN WITH THIS CONDITION, HOW SEVERE IS THIS PATIENT'S CONDITION?: FEW INFLAMMATORY LESIONS, SOME NONINFLAMMATORY

## 2024-10-04 ASSESSMENT — LOCATION SIMPLE DESCRIPTION DERM
LOCATION SIMPLE: RIGHT INFERIOR EYELID
LOCATION SIMPLE: UPPER BACK
LOCATION SIMPLE: RIGHT LIP

## 2024-10-04 ASSESSMENT — LOCATION ZONE DERM
LOCATION ZONE: EYELID
LOCATION ZONE: TRUNK
LOCATION ZONE: LIP

## 2024-10-04 ASSESSMENT — LOCATION DETAILED DESCRIPTION DERM
LOCATION DETAILED: SUPERIOR THORACIC SPINE
LOCATION DETAILED: RIGHT LATERAL INFERIOR EYELID
LOCATION DETAILED: RIGHT INFERIOR VERMILION LIP

## 2024-10-04 NOTE — PROCEDURE: ADDITIONAL NOTES
Render Risk Assessment In Note?: no
Additional Notes: Seeing , treating acne,  compound Rx. Suggested antioc cl foaming oily skin but sensitive , and xeracalm cr. Wi f/u 4 weeks and go from there.
Detail Level: Simple

## 2024-10-04 NOTE — PROCEDURE: TREATMENT REGIMEN
Detail Level: Zone
Initiate Treatment: SM73 ACNE 30G (0.3% adapalene, 2% niacinamide, 2.5% benzoyl peroxide, 1% clindamycin phosphate) apply a thin layer to face every night \\n\\n\\nBPO 10% wash for back every night

## 2024-10-04 NOTE — PROCEDURE: COUNSELING
Birth Control Pills Counseling: Birth Control Pill Counseling: I discussed with the patient the potential side effects of OCPs including but not limited to increased risk of stroke, heart attack, thrombophlebitis, deep venous thrombosis, hepatic adenomas, breast changes, GI upset, headaches, and depression.  The patient verbalized understanding of the proper use and possible adverse effects of OCPs. All of the patient's questions and concerns were addressed.
Isotretinoin Pregnancy And Lactation Text: This medication is Pregnancy Category X and is considered extremely dangerous during pregnancy. It is unknown if it is excreted in breast milk.
Spironolactone Counseling: Patient advised regarding risks of diarrhea, abdominal pain, hyperkalemia, birth defects (for female patients), liver toxicity and renal toxicity. The patient may need blood work to monitor liver and kidney function and potassium levels while on therapy. The patient verbalized understanding of the proper use and possible adverse effects of spironolactone.  All of the patient's questions and concerns were addressed.
Topical Sulfur Applications Counseling: Topical Sulfur Counseling: Patient counseled that this medication may cause skin irritation or allergic reactions.  In the event of skin irritation, the patient was advised to reduce the amount of the drug applied or use it less frequently.   The patient verbalized understanding of the proper use and possible adverse effects of topical sulfur application.  All of the patient's questions and concerns were addressed.
Benzoyl Peroxide Pregnancy And Lactation Text: This medication is Pregnancy Category C. It is unknown if benzoyl peroxide is excreted in breast milk.
Tetracycline Counseling: Patient counseled regarding possible photosensitivity and increased risk for sunburn.  Patient instructed to avoid sunlight, if possible.  When exposed to sunlight, patients should wear protective clothing, sunglasses, and sunscreen.  The patient was instructed to call the office immediately if the following severe adverse effects occur:  hearing changes, easy bruising/bleeding, severe headache, or vision changes.  The patient verbalized understanding of the proper use and possible adverse effects of tetracycline.  All of the patient's questions and concerns were addressed. Patient understands to avoid pregnancy while on therapy due to potential birth defects.
Azelaic Acid Pregnancy And Lactation Text: This medication is considered safe during pregnancy and breast feeding.
Erythromycin Pregnancy And Lactation Text: This medication is Pregnancy Category B and is considered safe during pregnancy. It is also excreted in breast milk.
Topical Clindamycin Counseling: Patient counseled that this medication may cause skin irritation or allergic reactions.  In the event of skin irritation, the patient was advised to reduce the amount of the drug applied or use it less frequently.   The patient verbalized understanding of the proper use and possible adverse effects of clindamycin.  All of the patient's questions and concerns were addressed.
Azithromycin Counseling:  I discussed with the patient the risks of azithromycin including but not limited to GI upset, allergic reaction, drug rash, diarrhea, and yeast infections.
Bactrim Counseling:  I discussed with the patient the risks of sulfa antibiotics including but not limited to GI upset, allergic reaction, drug rash, diarrhea, dizziness, photosensitivity, and yeast infections.  Rarely, more serious reactions can occur including but not limited to aplastic anemia, agranulocytosis, methemoglobinemia, blood dyscrasias, liver or kidney failure, lung infiltrates or desquamative/blistering drug rashes.
Detail Level: Detailed
Minocycline Counseling: Patient advised regarding possible photosensitivity and discoloration of the teeth, skin, lips, tongue and gums.  Patient instructed to avoid sunlight, if possible.  When exposed to sunlight, patients should wear protective clothing, sunglasses, and sunscreen.  The patient was instructed to call the office immediately if the following severe adverse effects occur:  hearing changes, easy bruising/bleeding, severe headache, or vision changes.  The patient verbalized understanding of the proper use and possible adverse effects of minocycline.  All of the patient's questions and concerns were addressed.
Tazorac Counseling:  Patient advised that medication is irritating and drying.  Patient may need to apply sparingly and wash off after an hour before eventually leaving it on overnight.  The patient verbalized understanding of the proper use and possible adverse effects of tazorac.  All of the patient's questions and concerns were addressed.
Winlevi Pregnancy And Lactation Text: This medication is considered safe during pregnancy and breastfeeding.
Doxycycline Pregnancy And Lactation Text: This medication is Pregnancy Category D and not consider safe during pregnancy. It is also excreted in breast milk but is considered safe for shorter treatment courses.
Aklief Pregnancy And Lactation Text: It is unknown if this medication is safe to use during pregnancy.  It is unknown if this medication is excreted in breast milk.  Breastfeeding women should use the topical cream on the smallest area of the skin for the shortest time needed while breastfeeding.  Do not apply to nipple and areola.
Sarecycline Counseling: Patient advised regarding possible photosensitivity and discoloration of the teeth, skin, lips, tongue and gums.  Patient instructed to avoid sunlight, if possible.  When exposed to sunlight, patients should wear protective clothing, sunglasses, and sunscreen.  The patient was instructed to call the office immediately if the following severe adverse effects occur:  hearing changes, easy bruising/bleeding, severe headache, or vision changes.  The patient verbalized understanding of the proper use and possible adverse effects of sarecycline.  All of the patient's questions and concerns were addressed.
Topical Sulfur Applications Pregnancy And Lactation Text: This medication is Pregnancy Category C and has an unknown safety profile during pregnancy. It is unknown if this topical medication is excreted in breast milk.
Include Pregnancy/Lactation Warning?: No
Topical Retinoid counseling:  Patient advised to apply a pea-sized amount only at bedtime and wait 30 minutes after washing their face before applying.  If too drying, patient may add a non-comedogenic moisturizer. The patient verbalized understanding of the proper use and possible adverse effects of retinoids.  All of the patient's questions and concerns were addressed.
Tetracycline Pregnancy And Lactation Text: This medication is Pregnancy Category D and not consider safe during pregnancy. It is also excreted in breast milk.
Dapsone Pregnancy And Lactation Text: This medication is Pregnancy Category C and is not considered safe during pregnancy or breast feeding.
Bactrim Pregnancy And Lactation Text: This medication is Pregnancy Category D and is known to cause fetal risk.  It is also excreted in breast milk.
Topical Clindamycin Pregnancy And Lactation Text: This medication is Pregnancy Category B and is considered safe during pregnancy. It is unknown if it is excreted in breast milk.
Isotretinoin Counseling: Patient should get monthly blood tests, not donate blood, not drive at night if vision affected, not share medication, and not undergo elective surgery for 6 months after tx completed. Side effects reviewed, pt to contact office should one occur.
Benzoyl Peroxide Counseling: Patient counseled that medicine may cause skin irritation and bleach clothing.  In the event of skin irritation, the patient was advised to reduce the amount of the drug applied or use it less frequently.   The patient verbalized understanding of the proper use and possible adverse effects of benzoyl peroxide.  All of the patient's questions and concerns were addressed.
Birth Control Pills Pregnancy And Lactation Text: This medication should be avoided if pregnant and for the first 30 days post-partum.
Spironolactone Pregnancy And Lactation Text: This medication can cause feminization of the male fetus and should be avoided during pregnancy. The active metabolite is also found in breast milk.
High Dose Vitamin A Counseling: Side effects reviewed, pt to contact office should one occur.
Azelaic Acid Counseling: Patient counseled that medicine may cause skin irritation and to avoid applying near the eyes.  In the event of skin irritation, the patient was advised to reduce the amount of the drug applied or use it less frequently.   The patient verbalized understanding of the proper use and possible adverse effects of azelaic acid.  All of the patient's questions and concerns were addressed.
Topical Retinoid Pregnancy And Lactation Text: This medication is Pregnancy Category C. It is unknown if this medication is excreted in breast milk.
Aklief counseling:  Patient advised to apply a pea-sized amount only at bedtime and wait 30 minutes after washing their face before applying.  If too drying, patient may add a non-comedogenic moisturizer.  The most commonly reported side effects including irritation, redness, scaling, dryness, stinging, burning, itching, and increased risk of sunburn.  The patient verbalized understanding of the proper use and possible adverse effects of retinoids.  All of the patient's questions and concerns were addressed.
Doxycycline Counseling:  Patient counseled regarding possible photosensitivity and increased risk for sunburn.  Patient instructed to avoid sunlight, if possible.  When exposed to sunlight, patients should wear protective clothing, sunglasses, and sunscreen.  The patient was instructed to call the office immediately if the following severe adverse effects occur:  hearing changes, easy bruising/bleeding, severe headache, or vision changes.  The patient verbalized understanding of the proper use and possible adverse effects of doxycycline.  All of the patient's questions and concerns were addressed.
Azithromycin Pregnancy And Lactation Text: This medication is considered safe during pregnancy and is also secreted in breast milk.
Erythromycin Counseling:  I discussed with the patient the risks of erythromycin including but not limited to GI upset, allergic reaction, drug rash, diarrhea, increase in liver enzymes, and yeast infections.
Tazorac Pregnancy And Lactation Text: This medication is not safe during pregnancy. It is unknown if this medication is excreted in breast milk.
Winlevi Counseling:  I discussed with the patient the risks of topical clascoterone including but not limited to erythema, scaling, itching, and stinging. Patient voiced their understanding.
High Dose Vitamin A Pregnancy And Lactation Text: High dose vitamin A therapy is contraindicated during pregnancy and breast feeding.
Dapsone Counseling: I discussed with the patient the risks of dapsone including but not limited to hemolytic anemia, agranulocytosis, rashes, methemoglobinemia, kidney failure, peripheral neuropathy, headaches, GI upset, and liver toxicity.  Patients who start dapsone require monitoring including baseline LFTs and weekly CBCs for the first month, then every month thereafter.  The patient verbalized understanding of the proper use and possible adverse effects of dapsone.  All of the patient's questions and concerns were addressed.
Detail Level: Zone